# Patient Record
Sex: FEMALE | Race: WHITE | NOT HISPANIC OR LATINO | Employment: OTHER
[De-identification: names, ages, dates, MRNs, and addresses within clinical notes are randomized per-mention and may not be internally consistent; named-entity substitution may affect disease eponyms.]

---

## 2021-10-26 ENCOUNTER — TRANSCRIBE ORDERS (OUTPATIENT)
Dept: OTHER | Age: 81
End: 2021-10-26

## 2021-10-26 ENCOUNTER — PRE VISIT (OUTPATIENT)
Dept: OTHER | Age: 81
End: 2021-10-26

## 2021-10-26 DIAGNOSIS — D25.9 FIBROID UTERUS: ICD-10-CM

## 2021-10-26 DIAGNOSIS — N83.8 OVARIAN MASS: Primary | ICD-10-CM

## 2021-10-28 NOTE — TELEPHONE ENCOUNTER
RECORDS STATUS - ALL OTHER DIAGNOSIS      Action    Action Taken 11/3/21  LVM for pt re: location of PET    2021 10:13 AM ABT  Imaging disc from [facility] received and given to Nathan for upload.       RECORDS RECEIVED FROM: [facility]   DATE RECEIVED: 10/27/21   NOTES STATUS DETAILS   OFFICE NOTE from referring provider CE - [facility] 10/21/21   OFFICE NOTE from medical oncologist     DISCHARGE SUMMARY from hospital     DISCHARGE REPORT from the ER CE - [facility] 9/3/21   OPERATIVE REPORT     MEDICATION LIST [facility]   CLINICAL TRIAL TREATMENTS TO DATE     LABS     PATHOLOGY REPORTS NA    ANYTHING RELATED TO DIAGNOSIS Epic/CE 10/6/21   GENONOMIC TESTING     TYPE:     IMAGING (NEED IMAGES & REPORT)     NM Bone Scan Whole Body (Pet?) Requested 11/3 10/15/21: [facility]    [employer] [facility]   CT SCANS PACS 10/4/21: [facility]   MRI PACS 10/17/21: [facility]   MAMMO     ULTRASOUND PACS 10/7/21: [facility]   PET

## 2021-11-03 ENCOUNTER — PRE VISIT (OUTPATIENT)
Dept: ONCOLOGY | Facility: CLINIC | Age: 81
End: 2021-11-03

## 2021-11-03 ENCOUNTER — ONCOLOGY VISIT (OUTPATIENT)
Dept: ONCOLOGY | Facility: CLINIC | Age: 81
End: 2021-11-03
Attending: OBSTETRICS & GYNECOLOGY
Payer: COMMERCIAL

## 2021-11-03 VITALS
SYSTOLIC BLOOD PRESSURE: 144 MMHG | RESPIRATION RATE: 16 BRPM | OXYGEN SATURATION: 97 % | DIASTOLIC BLOOD PRESSURE: 91 MMHG | HEART RATE: 81 BPM | WEIGHT: 139.5 LBS | BODY MASS INDEX: 24.72 KG/M2 | HEIGHT: 63 IN | TEMPERATURE: 97.8 F

## 2021-11-03 DIAGNOSIS — R97.8 OTHER ABNORMAL TUMOR MARKERS: ICD-10-CM

## 2021-11-03 DIAGNOSIS — D25.9 FIBROID UTERUS: ICD-10-CM

## 2021-11-03 DIAGNOSIS — N83.8 OVARIAN MASS: ICD-10-CM

## 2021-11-03 DIAGNOSIS — C50.919 BREAST CA (H): Primary | ICD-10-CM

## 2021-11-03 PROCEDURE — G0463 HOSPITAL OUTPT CLINIC VISIT: HCPCS

## 2021-11-03 PROCEDURE — 36415 COLL VENOUS BLD VENIPUNCTURE: CPT | Performed by: OBSTETRICS & GYNECOLOGY

## 2021-11-03 PROCEDURE — 86300 IMMUNOASSAY TUMOR CA 15-3: CPT | Performed by: OBSTETRICS & GYNECOLOGY

## 2021-11-03 PROCEDURE — 82378 CARCINOEMBRYONIC ANTIGEN: CPT | Performed by: OBSTETRICS & GYNECOLOGY

## 2021-11-03 PROCEDURE — 99205 OFFICE O/P NEW HI 60 MIN: CPT | Performed by: OBSTETRICS & GYNECOLOGY

## 2021-11-03 RX ORDER — LORAZEPAM 0.5 MG/1
.25-.5 TABLET ORAL
COMMUNITY
Start: 2021-10-06

## 2021-11-03 RX ORDER — MELOXICAM 7.5 MG/1
TABLET ORAL
Status: ON HOLD | COMMUNITY
Start: 2021-09-03 | End: 2021-11-16

## 2021-11-03 RX ORDER — ACETAMINOPHEN 500 MG
500 TABLET ORAL DAILY PRN
COMMUNITY

## 2021-11-03 RX ORDER — HEPARIN SODIUM 5000 [USP'U]/.5ML
5000 INJECTION, SOLUTION INTRAVENOUS; SUBCUTANEOUS
Status: CANCELLED | OUTPATIENT
Start: 2021-11-03

## 2021-11-03 RX ORDER — CEFAZOLIN SODIUM 2 G/50ML
2 SOLUTION INTRAVENOUS
Status: CANCELLED | OUTPATIENT
Start: 2021-11-03

## 2021-11-03 RX ORDER — PHENAZOPYRIDINE HYDROCHLORIDE 200 MG/1
200 TABLET, FILM COATED ORAL ONCE
Status: CANCELLED | OUTPATIENT
Start: 2021-11-03 | End: 2021-11-03

## 2021-11-03 RX ORDER — CEFAZOLIN SODIUM 2 G/50ML
2 SOLUTION INTRAVENOUS SEE ADMIN INSTRUCTIONS
Status: CANCELLED | OUTPATIENT
Start: 2021-11-03

## 2021-11-03 ASSESSMENT — MIFFLIN-ST. JEOR: SCORE: 1076.9

## 2021-11-03 ASSESSMENT — PAIN SCALES - GENERAL: PAINLEVEL: NO PAIN (0)

## 2021-11-03 NOTE — NURSING NOTE
"Oncology Rooming Note    November 3, 2021 8:47 AM   Amaya York is a 80 year old female who presents for:    Chief Complaint   Patient presents with     Oncology Clinic Visit     Ovarian mass      Initial Vitals: BP (!) 144/91   Pulse 81   Temp 97.8  F (36.6  C)   Resp 16   Ht 1.6 m (5' 3\")   Wt 63.3 kg (139 lb 8 oz)   SpO2 97%   BMI 24.71 kg/m   Estimated body mass index is 24.71 kg/m  as calculated from the following:    Height as of this encounter: 1.6 m (5' 3\").    Weight as of this encounter: 63.3 kg (139 lb 8 oz). Body surface area is 1.68 meters squared.  No Pain (0) Comment: Data Unavailable   No LMP recorded. Patient is postmenopausal.  Allergies reviewed: Yes  Medications reviewed: Yes    Medications: Medication refills not needed today.  Pharmacy name entered into EPIC: [facility] - [address]    Clinical concerns: Has a few questions George was notified.      Corbin Marie MA            "

## 2021-11-03 NOTE — PROGRESS NOTES
Consult Notes on Referred Patient    Date: 11/3/2021       [facility]  [address] [address] [place] [address]       RE: Amaya Yrok  : 1940  MARIANA: 11/3/2021    Dear Dr. Leonard Banegas:    I had the pleasure of seeing your patient Amaya York here at the Gynecologic Cancer Clinic at the [facility] on 11/3/2021.  As you know she is a very pleasant 80 year old woman with a recent diagnosis of right pelvic mass.  Given these findings she was subsequently sent to the Gynecologic Cancer Clinic for new patient consultation.   , 2 prior vaginal deliveries.  Went through menopause at age 52.  Never been on hormone replacement therapy.  She has had a stage I breast cancer in the past, was on 5 years of aromatase inhibitor and had a lumpectomy with sentinel nodes.  Has been disease-free for many years now.  She is eating and drinking well.  No nausea, vomiting, fever or chills.  Normal urinary and bowel function.  No vaginal bleeding or discharge.  Noticed some lower back pain when she was active outside.  Of note, she is very active.  She walks 2 miles a day and bike rides regularly.  A workup revealed a complex right pelvic mass based on MRI-ultrasound, possibly a fibroid in the broad ligament versus from the right adnexa.  Her CA-125 was slightly elevated in the 40s.  No other evidence of any distant disease.          Past Medical History:  History of breast cancer.          Past Surgical History:  Open tubal ligation, open appendectomy and lumpectomy with sentinel lymph node sampling.        Health Maintenance:  Health Maintenance Due   Topic Date Due     DEXA  Never done     ADVANCE CARE PLANNING  Never done     HEPATITIS C SCREENING  Never done     LIPID  Never done     ZOSTER IMMUNIZATION (1 of 2) Never done     MEDICARE ANNUAL WELLNESS VISIT  Never done     FALL RISK ASSESSMENT  Never done     Pneumococcal Vaccine:  "65+ Years (1 of 1 - PPSV23) 12/14/2006     PHQ-2  Never done     INFLUENZA VACCINE (1) 09/01/2021       Never had a colonoscopy.            Current Medications:     has a current medication list which includes the following prescription(s): acetaminophen.       Allergies:     Codeine        Social History:     Social History     Tobacco Use     Smoking status: Never Smoker     Smokeless tobacco: Never Used   Substance Use Topics     Alcohol use: Not Currently       History   Drug Use Unknown           Family History:             Physical Exam:     BP (!) 144/91   Pulse 81   Temp 97.8  F (36.6  C)   Resp 16   Ht 1.6 m (5' 3\")   Wt 63.3 kg (139 lb 8 oz)   SpO2 97%   BMI 24.71 kg/m    Body mass index is 24.71 kg/m .    General Appearance: healthy and alert, no distress     Musculoskeletal: extremities non tender and without edema    Skin: no lesions or rashes     Neurological: normal gait, no gross defects     Psychiatric: appropriate mood and affect                               Hematological: normal cervical, supraclavicular and inguinal lymph nodes     ABDOMEN:  Soft, nontender, nondistended.  No organomegaly.  Well-healed midline incision as well as a right lower quadrant incision.  PELVIC EXAMINATION:  Normal external genitalia, normal vaginal mucosa, normal-appearing cervix.  Bimanual exam reveals a mobile mass on the right side.  It is nontender.  Rectovaginal confirms.          Assessment:    Amaya York is a 80 year old woman with a new diagnosis of complex pelvic mass.     A total of 60 minutes was spent with the patient, 40 minutes of which were spent in counseling the patient and/or treatment planning.    ASSESSMENT AND PLAN:      1. Complex right pelvic mass.     2. Elevated  in the 40s.     3. History of breast cancer.       4. Precision Medicine Program.      I discussed with the patient.  Recommended to proceed with a laparoscopic bilateral salpingo-oophorectomy, possible open, " possible cancer staging, possible hysterectomy.  Of note, she has gotten a PET scan.  We do not have the results.  If she has had a PET scan we do not need any additional imaging.  Otherwise, CT of the chest.  We will also obtain a CA as well as CA27-29 today.  We will have her see my colleagues in Anesthesia for preoperative optimization.  The patient agrees with this plan.  We will also consider for the Precision Medicine Program.  She agrees with plans.            Thank you for allowing us to participate in the care of your patient.         Sincerely,    Bobby Vazquez MD, MS    Department of Obstetrics and Gynecology   Division of Gynecologic Oncology   [facility]  Phone: [phone]    CC  Patient Care Team:  Orion Gerard PA-C as PCP - General  ORION GERARD

## 2021-11-03 NOTE — NURSING NOTE
Patient labs drawn in clinic via venipuncture. Patient tolerated well. See flowsheet for details.      Erin Can, CHERRY on 11/3/2021 at 10:24 AM

## 2021-11-03 NOTE — LETTER
11/3/2021         RE: Amaya York  [address] [facility] [address]        Dear Colleague,    Thank you for referring your patient, Amaya York, to the [facility] CANCER [facility]. Please see a copy of my visit note below.                            Consult Notes on Referred Patient    Date: 11/3/2021       Dr. Leonard Banegas, [facility]  [address] [address] [place] [address]       RE: Amaya York  : 1940  MARIANA: 11/3/2021    Dear Dr. Leonard Banegas:    I had the pleasure of seeing your patient Amaya York here at the Gynecologic Cancer Clinic at the [facility] on 11/3/2021.  As you know she is a very pleasant 80 year old woman with a recent diagnosis of right pelvic mass.  Given these findings she was subsequently sent to the Gynecologic Cancer Clinic for new patient consultation.       Past Medical History:    No past medical history on file.      Past Surgical History:    No past surgical history on file.      Health Maintenance:  Health Maintenance Due   Topic Date Due     DEXA  Never done     ADVANCE CARE PLANNING  Never done     HEPATITIS C SCREENING  Never done     LIPID  Never done     ZOSTER IMMUNIZATION (1 of 2) Never done     MEDICARE ANNUAL WELLNESS VISIT  Never done     FALL RISK ASSESSMENT  Never done     Pneumococcal Vaccine: 65+ Years (1 of 1 - PPSV23) 2006     PHQ-2  Never done     INFLUENZA VACCINE (1) 2021       Last Pap Smear: ***              Result: {NORMAL/ABNORMAL:925350}  She {HAS:618963} had a history of abnormal Pap smears.    Last Mammogram: ***              Result: {NORMAL/ABNORMAL:284271}      She {HAS:848792} had a history of abnormal mammograms.    Last Colonoscopy: ***              Result: {NORMAL/ABNORMAL:299010}                    Last DEXA Scan: ***              Result: {NORMAL/ABNORMAL:696221}    Last Diabetes Screening; ***    Last Thyroid Screening:      ***    Last Cholest Screening:       "***      Current Medications:     has a current medication list which includes the following prescription(s): acetaminophen.       Allergies:     Codeine        Social History:     Social History     Tobacco Use     Smoking status: Never Smoker     Smokeless tobacco: Never Used   Substance Use Topics     Alcohol use: Not Currently       History   Drug Use Unknown           Family History:     The patient's family history is notable for ***.    No family history on file.      Physical Exam:     BP (!) 144/91   Pulse 81   Temp 97.8  F (36.6  C)   Resp 16   Ht 1.6 m (5' 3\")   Wt 63.3 kg (139 lb 8 oz)   SpO2 97%   BMI 24.71 kg/m    Body mass index is 24.71 kg/m .    General Appearance: healthy and alert, no distress     HEENT:  no thyromegaly, no palpable nodules or masses        Cardiovascular: regular rate and rhythm, no gallops, rubs or murmurs     Respiratory: lungs clear, no rales, rhonchi or wheezes, normal diaphragmatic excursion    Musculoskeletal: extremities non tender and without edema    Skin: no lesions or rashes     Neurological: normal gait, no gross defects     Psychiatric: appropriate mood and affect                               Hematological: normal cervical, supraclavicular and inguinal lymph nodes     Gastrointestinal:       abdomen soft, non-tender, non-distended, no organomegaly or masses    Genitourinary: External genitalia and urethral meatus appears normal.  Vagina is smooth without nodularity or masses.  Cervix appears normal and without lesions.  Bimanual exam reveal no masses, nodularity or fullness.  Recto-vaginal exam confirms these findings.      Assessment:    Amaya York is a 80 year old woman with a new diagnosis of ***.     A total of *** minutes was spent with the patient, *** minutes of which were spent in counseling the patient and/or treatment planning.      Plan:     1.)   ***     2.) Genetic risk factors were assessed and the patient does not meet the qualifications " for a referral.      3.) Labs and/or tests ordered include:  ***.     4.) Health maintenance issues addressed today include ***.    5.) Pre-op teaching was completed today.  Risks of surgery were discussed to include: bleeding, transfusion, infection, unintentional injury to surrounding organs/structures.      Thank you for allowing us to participate in the care of your patient.         Sincerely,        Patient Care Team:  Orion Gerard PA-C as PCP - General  ORION GERARD        Again, thank you for allowing me to participate in the care of your patient.        Sincerely,        Bobby Vazquez MD

## 2021-11-03 NOTE — LETTER
11/3/2021      RE: Amaya York  [address] [facility] [address]                               Consult Notes on Referred Patient    Date: 11/3/2021       [facility]  [address] [address] [place] [address]       RE: Amaya York  : 1940  MARIANA: 11/3/2021    Dear Dr. Leonard Banegas:    I had the pleasure of seeing your patient Amaya York here at the Gynecologic Cancer Clinic at the [facility] on 11/3/2021.  As you know she is a very pleasant 80 year old woman with a recent diagnosis of right pelvic mass.  Given these findings she was subsequently sent to the Gynecologic Cancer Clinic for new patient consultation.   , 2 prior vaginal deliveries.  Went through menopause at age 52.  Never been on hormone replacement therapy.  She has had a stage I breast cancer in the past, was on 5 years of aromatase inhibitor and had a lumpectomy with sentinel nodes.  Has been disease-free for many years now.  She is eating and drinking well.  No nausea, vomiting, fever or chills.  Normal urinary and bowel function.  No vaginal bleeding or discharge.  Noticed some lower back pain when she was active outside.  Of note, she is very active.  She walks 2 miles a day and bike rides regularly.  A workup revealed a complex right pelvic mass based on MRI-ultrasound, possibly a fibroid in the broad ligament versus from the right adnexa.  Her CA-125 was slightly elevated in the 40s.  No other evidence of any distant disease.          Past Medical History:  History of breast cancer.          Past Surgical History:  Open tubal ligation, open appendectomy and lumpectomy with sentinel lymph node sampling.        Health Maintenance:  Health Maintenance Due   Topic Date Due     DEXA  Never done     ADVANCE CARE PLANNING  Never done     HEPATITIS C SCREENING  Never done     LIPID  Never done     ZOSTER IMMUNIZATION (1 of 2) Never done     MEDICARE ANNUAL WELLNESS VISIT   "Never done     FALL RISK ASSESSMENT  Never done     Pneumococcal Vaccine: 65+ Years (1 of 1 - PPSV23) 12/14/2006     PHQ-2  Never done     INFLUENZA VACCINE (1) 09/01/2021       Never had a colonoscopy.            Current Medications:     has a current medication list which includes the following prescription(s): acetaminophen.       Allergies:     Codeine        Social History:     Social History     Tobacco Use     Smoking status: Never Smoker     Smokeless tobacco: Never Used   Substance Use Topics     Alcohol use: Not Currently       History   Drug Use Unknown           Family History:             Physical Exam:     BP (!) 144/91   Pulse 81   Temp 97.8  F (36.6  C)   Resp 16   Ht 1.6 m (5' 3\")   Wt 63.3 kg (139 lb 8 oz)   SpO2 97%   BMI 24.71 kg/m    Body mass index is 24.71 kg/m .    General Appearance: healthy and alert, no distress     Musculoskeletal: extremities non tender and without edema    Skin: no lesions or rashes     Neurological: normal gait, no gross defects     Psychiatric: appropriate mood and affect                               Hematological: normal cervical, supraclavicular and inguinal lymph nodes     ABDOMEN:  Soft, nontender, nondistended.  No organomegaly.  Well-healed midline incision as well as a right lower quadrant incision.  PELVIC EXAMINATION:  Normal external genitalia, normal vaginal mucosa, normal-appearing cervix.  Bimanual exam reveals a mobile mass on the right side.  It is nontender.  Rectovaginal confirms.          Assessment:    Amaya York is a 80 year old woman with a new diagnosis of complex pelvic mass.     A total of 60 minutes was spent with the patient, 40 minutes of which were spent in counseling the patient and/or treatment planning.    ASSESSMENT AND PLAN:      1. Complex right pelvic mass.     2. Elevated  in the 40s.     3. History of breast cancer.       4. Precision Medicine Program.      I discussed with the patient.  Recommended to proceed " with a laparoscopic bilateral salpingo-oophorectomy, possible open, possible cancer staging, possible hysterectomy.  Of note, she has gotten a PET scan.  We do not have the results.  If she has had a PET scan we do not need any additional imaging.  Otherwise, CT of the chest.  We will also obtain a CA as well as CA27-29 today.  We will have her see my colleagues in Anesthesia for preoperative optimization.  The patient agrees with this plan.  We will also consider for the Precision Medicine Program.  She agrees with plans.            Thank you for allowing us to participate in the care of your patient.         Sincerely,    Bobby Vazquez MD, MS    Department of Obstetrics and Gynecology   Division of Gynecologic Oncology   [facility]  Phone: [phone]    CC  Patient Care Team:  Leonard Banegas PA-C as PCP - General

## 2021-11-04 LAB
CANCER AG27-29 SERPL-ACNC: 42 U/ML (ref 0–39)
CEA SERPL-MCNC: <0.5 UG/L (ref 0–2.5)

## 2021-11-05 ENCOUNTER — PATIENT OUTREACH (OUTPATIENT)
Dept: ONCOLOGY | Facility: CLINIC | Age: 81
End: 2021-11-05

## 2021-11-05 ENCOUNTER — TELEPHONE (OUTPATIENT)
Dept: ONCOLOGY | Facility: CLINIC | Age: 81
End: 2021-11-05

## 2021-11-05 DIAGNOSIS — Z11.59 ENCOUNTER FOR SCREENING FOR OTHER VIRAL DISEASES: ICD-10-CM

## 2021-11-05 DIAGNOSIS — R19.00 PELVIC MASS: Primary | ICD-10-CM

## 2021-11-05 DIAGNOSIS — C50.919 BREAST CA (H): ICD-10-CM

## 2021-11-05 NOTE — TELEPHONE ENCOUNTER
RNCC: Sumi Frias; [phone]    Surgery is scheduled with Dr. Vazquez on 11/16 at [facility].  Scheduled per availability.    H&P to be completed by Primary Care Provider; [facility].    COVID-19 test: 11[place]15 [place] [facility] 1st floor    Post-op: 12/8, telephone visit     The RN completed the education regarding the surgery.     Patient will receive a phone call from pre-admission nurses 1-2 days prior to surgery with arrival and start time.    The surgery packet was provided by the RN during appointment and sent via US mail    Patient will complete COVID-19 test that was scheduled by surgical coordinator 2-4 days prior to surgery.     I spoke with the patient and was able to confirm the scheduled information. No further action needed at this time.

## 2021-11-05 NOTE — TELEPHONE ENCOUNTER
RN reached out to patient to review surgery needs.    It was confirmed that patient did NOT have a PET scan and will need a Chest CT scan locally prior to surgery.     PAC could not be completed virtual [place]. NO PAC appt available day prior to surgery. Patient unable to come any other time.     Patient was able to get in with PCP for pre operative exam. This was discussed with Dr Vazquez and was approved.     Patient will see PCP 11/8 for pre operative exam    COVID test in our system as planned     CT scan order placed and  faxed to local team     Patient aware of the above     Gracie Frias RN

## 2021-11-05 NOTE — NURSING NOTE
UPDATE 11/5  Patient will complete pre operative exam with local PCP on 11/8. Note will be available in Care Everywhere once completed and signed. This was approved by MD    CT scan will completed locally as well.     Gracie Frias RN

## 2021-11-05 NOTE — TELEPHONE ENCOUNTER
----- Message from Iesha Conklin sent at 2021 12:56 PM CDT -----  Regardin/16 George  Can you call her about H&P, PET and CT?    She will need to do H&P near home because we can't do [place] PAC for [place] patients and no clinic openings 11/15 for PAC. :(     Surgery scheduled on  with Dr. Vazquez at [facility]    Appointments:   COVID: 11/15 at [facility], lab 1st floor    POST-OP:  telephone    I was able to confirm the above dates.     - Iesha     ----- Message -----  From: Gracie Frias RN  Sent: 11/3/2021   3:03 PM CDT  To: Iesha Saldana   PAC   Possible CT CHest     POst op virtual     THANKS  Bhavani

## 2021-11-11 ENCOUNTER — PATIENT OUTREACH (OUTPATIENT)
Dept: ONCOLOGY | Facility: CLINIC | Age: 81
End: 2021-11-11
Payer: COMMERCIAL

## 2021-11-11 NOTE — TELEPHONE ENCOUNTER
Patient left a voicemail stating PA needed to be completed ASAP for imaging she wanted completed in [place]     Patient did not request a call back just stated that I needed to be aware.     Outside PA team was contacted and they stated they were working on this.     Today RN received a note that stated patient was concerned as she had tried to reach the team and did not hear back and still has not completed the CT scan and will be home only another day.    RN reached out to patient and apologized that she miss understood the voicemail.     RN explained the difficulties of completing a PA for another location and the delay this causes.     Patient offered a CT Scan on Monday here at the [facility]    Patient agreeable to this.     Scheduling request sent    Gracie Frias RN

## 2021-11-15 ENCOUNTER — ANESTHESIA EVENT (OUTPATIENT)
Dept: SURGERY | Facility: CLINIC | Age: 81
End: 2021-11-15
Payer: COMMERCIAL

## 2021-11-15 ENCOUNTER — ANCILLARY PROCEDURE (OUTPATIENT)
Dept: CT IMAGING | Facility: CLINIC | Age: 81
End: 2021-11-15
Attending: OBSTETRICS & GYNECOLOGY
Payer: COMMERCIAL

## 2021-11-15 ENCOUNTER — LAB (OUTPATIENT)
Dept: LAB | Facility: CLINIC | Age: 81
End: 2021-11-15

## 2021-11-15 DIAGNOSIS — Z11.59 ENCOUNTER FOR SCREENING FOR OTHER VIRAL DISEASES: ICD-10-CM

## 2021-11-15 DIAGNOSIS — R19.00 PELVIC MASS: ICD-10-CM

## 2021-11-15 DIAGNOSIS — C50.919 BREAST CA (H): ICD-10-CM

## 2021-11-15 LAB
CREAT BLD-MCNC: 1 MG/DL (ref 0.5–1)
GFR SERPL CREATININE-BSD FRML MDRD: 54 ML/MIN/1.73M2
SARS-COV-2 RNA RESP QL NAA+PROBE: NEGATIVE

## 2021-11-15 PROCEDURE — 71260 CT THORAX DX C+: CPT | Performed by: RADIOLOGY

## 2021-11-15 PROCEDURE — U0003 INFECTIOUS AGENT DETECTION BY NUCLEIC ACID (DNA OR RNA); SEVERE ACUTE RESPIRATORY SYNDROME CORONAVIRUS 2 (SARS-COV-2) (CORONAVIRUS DISEASE [COVID-19]), AMPLIFIED PROBE TECHNIQUE, MAKING USE OF HIGH THROUGHPUT TECHNOLOGIES AS DESCRIBED BY CMS-2020-01-R: HCPCS | Mod: 90 | Performed by: PATHOLOGY

## 2021-11-15 PROCEDURE — U0005 INFEC AGEN DETEC AMPLI PROBE: HCPCS | Mod: 90 | Performed by: PATHOLOGY

## 2021-11-15 RX ORDER — IOPAMIDOL 755 MG/ML
68 INJECTION, SOLUTION INTRAVASCULAR ONCE
Status: COMPLETED | OUTPATIENT
Start: 2021-11-15 | End: 2021-11-15

## 2021-11-15 RX ADMIN — IOPAMIDOL 68 ML: 755 INJECTION, SOLUTION INTRAVASCULAR at 16:13

## 2021-11-16 ENCOUNTER — HOSPITAL ENCOUNTER (OUTPATIENT)
Facility: CLINIC | Age: 81
Discharge: HOME | End: 2021-11-16
Attending: OBSTETRICS & GYNECOLOGY | Admitting: OBSTETRICS & GYNECOLOGY
Payer: COMMERCIAL

## 2021-11-16 ENCOUNTER — ANESTHESIA (OUTPATIENT)
Dept: SURGERY | Facility: CLINIC | Age: 81
End: 2021-11-16
Payer: COMMERCIAL

## 2021-11-16 VITALS
SYSTOLIC BLOOD PRESSURE: 132 MMHG | OXYGEN SATURATION: 96 % | WEIGHT: 139.55 LBS | RESPIRATION RATE: 18 BRPM | HEART RATE: 89 BPM | HEIGHT: 63 IN | TEMPERATURE: 97.7 F | BODY MASS INDEX: 24.73 KG/M2 | DIASTOLIC BLOOD PRESSURE: 72 MMHG

## 2021-11-16 DIAGNOSIS — Z90.722 S/P BSO (BILATERAL SALPINGO-OOPHORECTOMY): Primary | ICD-10-CM

## 2021-11-16 DIAGNOSIS — N83.8 OVARIAN MASS: ICD-10-CM

## 2021-11-16 DIAGNOSIS — Z98.890 STATUS POST LAPAROSCOPY: Primary | ICD-10-CM

## 2021-11-16 LAB
ABO/RH(D): NORMAL
ANTIBODY SCREEN: NEGATIVE
GLUCOSE BLDC GLUCOMTR-MCNC: 91 MG/DL (ref 70–99)
SPECIMEN EXPIRATION DATE: NORMAL

## 2021-11-16 PROCEDURE — 250N000011 HC RX IP 250 OP 636: Performed by: NURSE ANESTHETIST, CERTIFIED REGISTERED

## 2021-11-16 PROCEDURE — 86900 BLOOD TYPING SEROLOGIC ABO: CPT | Performed by: NURSE ANESTHETIST, CERTIFIED REGISTERED

## 2021-11-16 PROCEDURE — 250N000011 HC RX IP 250 OP 636: Performed by: OBSTETRICS & GYNECOLOGY

## 2021-11-16 PROCEDURE — 250N000013 HC RX MED GY IP 250 OP 250 PS 637: Performed by: OBSTETRICS & GYNECOLOGY

## 2021-11-16 PROCEDURE — 250N000025 HC SEVOFLURANE, PER MIN: Performed by: OBSTETRICS & GYNECOLOGY

## 2021-11-16 PROCEDURE — 250N000009 HC RX 250: Performed by: NURSE ANESTHETIST, CERTIFIED REGISTERED

## 2021-11-16 PROCEDURE — 710N000012 HC RECOVERY PHASE 2, PER MINUTE: Performed by: OBSTETRICS & GYNECOLOGY

## 2021-11-16 PROCEDURE — 360N000076 HC SURGERY LEVEL 3, PER MIN: Performed by: OBSTETRICS & GYNECOLOGY

## 2021-11-16 PROCEDURE — 88307 TISSUE EXAM BY PATHOLOGIST: CPT | Mod: 26 | Performed by: PATHOLOGY

## 2021-11-16 PROCEDURE — 88342 IMHCHEM/IMCYTCHM 1ST ANTB: CPT | Mod: 26 | Performed by: PATHOLOGY

## 2021-11-16 PROCEDURE — 36415 COLL VENOUS BLD VENIPUNCTURE: CPT | Performed by: NURSE ANESTHETIST, CERTIFIED REGISTERED

## 2021-11-16 PROCEDURE — 250N000011 HC RX IP 250 OP 636: Performed by: ANESTHESIOLOGY

## 2021-11-16 PROCEDURE — 88313 SPECIAL STAINS GROUP 2: CPT | Mod: 26 | Performed by: PATHOLOGY

## 2021-11-16 PROCEDURE — 258N000003 HC RX IP 258 OP 636: Performed by: NURSE ANESTHETIST, CERTIFIED REGISTERED

## 2021-11-16 PROCEDURE — 710N000010 HC RECOVERY PHASE 1, LEVEL 2, PER MIN: Performed by: OBSTETRICS & GYNECOLOGY

## 2021-11-16 PROCEDURE — 88112 CYTOPATH CELL ENHANCE TECH: CPT | Mod: 26 | Performed by: PATHOLOGY

## 2021-11-16 PROCEDURE — 88305 TISSUE EXAM BY PATHOLOGIST: CPT | Mod: 26 | Performed by: PATHOLOGY

## 2021-11-16 PROCEDURE — 88331 PATH CONSLTJ SURG 1 BLK 1SPC: CPT | Mod: TC | Performed by: OBSTETRICS & GYNECOLOGY

## 2021-11-16 PROCEDURE — 999N000141 HC STATISTIC PRE-PROCEDURE NURSING ASSESSMENT: Performed by: OBSTETRICS & GYNECOLOGY

## 2021-11-16 PROCEDURE — 272N000001 HC OR GENERAL SUPPLY STERILE: Performed by: OBSTETRICS & GYNECOLOGY

## 2021-11-16 PROCEDURE — 88112 CYTOPATH CELL ENHANCE TECH: CPT | Mod: TC | Performed by: OBSTETRICS & GYNECOLOGY

## 2021-11-16 PROCEDURE — 88331 PATH CONSLTJ SURG 1 BLK 1SPC: CPT | Mod: 26 | Performed by: PATHOLOGY

## 2021-11-16 PROCEDURE — 370N000017 HC ANESTHESIA TECHNICAL FEE, PER MIN: Performed by: OBSTETRICS & GYNECOLOGY

## 2021-11-16 RX ORDER — LIDOCAINE 40 MG/G
CREAM TOPICAL
Status: DISCONTINUED | OUTPATIENT
Start: 2021-11-16 | End: 2021-11-16 | Stop reason: HOSPADM

## 2021-11-16 RX ORDER — CEFAZOLIN SODIUM 2 G/100ML
2 INJECTION, SOLUTION INTRAVENOUS SEE ADMIN INSTRUCTIONS
Status: DISCONTINUED | OUTPATIENT
Start: 2021-11-16 | End: 2021-11-16 | Stop reason: HOSPADM

## 2021-11-16 RX ORDER — ACETAMINOPHEN 325 MG/1
975 TABLET ORAL EVERY 6 HOURS PRN
Qty: 50 TABLET | Refills: 0 | Status: SHIPPED | OUTPATIENT
Start: 2021-11-16

## 2021-11-16 RX ORDER — HEPARIN SODIUM 5000 [USP'U]/.5ML
5000 INJECTION, SOLUTION INTRAVENOUS; SUBCUTANEOUS
Status: COMPLETED | OUTPATIENT
Start: 2021-11-16 | End: 2021-11-16

## 2021-11-16 RX ORDER — LIDOCAINE HYDROCHLORIDE 20 MG/ML
INJECTION, SOLUTION INFILTRATION; PERINEURAL PRN
Status: DISCONTINUED | OUTPATIENT
Start: 2021-11-16 | End: 2021-11-16

## 2021-11-16 RX ORDER — ONDANSETRON 2 MG/ML
4 INJECTION INTRAMUSCULAR; INTRAVENOUS EVERY 30 MIN PRN
Status: DISCONTINUED | OUTPATIENT
Start: 2021-11-16 | End: 2021-11-16 | Stop reason: HOSPADM

## 2021-11-16 RX ORDER — ONDANSETRON 4 MG/1
4 TABLET, ORALLY DISINTEGRATING ORAL EVERY 30 MIN PRN
Status: DISCONTINUED | OUTPATIENT
Start: 2021-11-16 | End: 2021-11-16 | Stop reason: HOSPADM

## 2021-11-16 RX ORDER — PHENAZOPYRIDINE HYDROCHLORIDE 200 MG/1
200 TABLET, FILM COATED ORAL ONCE
Status: COMPLETED | OUTPATIENT
Start: 2021-11-16 | End: 2021-11-16

## 2021-11-16 RX ORDER — PROPOFOL 10 MG/ML
INJECTION, EMULSION INTRAVENOUS PRN
Status: DISCONTINUED | OUTPATIENT
Start: 2021-11-16 | End: 2021-11-16

## 2021-11-16 RX ORDER — ONDANSETRON 2 MG/ML
INJECTION INTRAMUSCULAR; INTRAVENOUS PRN
Status: DISCONTINUED | OUTPATIENT
Start: 2021-11-16 | End: 2021-11-16

## 2021-11-16 RX ORDER — OXYCODONE HYDROCHLORIDE 5 MG/1
5 TABLET ORAL EVERY 4 HOURS PRN
Status: DISCONTINUED | OUTPATIENT
Start: 2021-11-16 | End: 2021-11-16 | Stop reason: HOSPADM

## 2021-11-16 RX ORDER — EPHEDRINE SULFATE 50 MG/ML
INJECTION, SOLUTION INTRAMUSCULAR; INTRAVENOUS; SUBCUTANEOUS PRN
Status: DISCONTINUED | OUTPATIENT
Start: 2021-11-16 | End: 2021-11-16

## 2021-11-16 RX ORDER — SODIUM CHLORIDE, SODIUM LACTATE, POTASSIUM CHLORIDE, CALCIUM CHLORIDE 600; 310; 30; 20 MG/100ML; MG/100ML; MG/100ML; MG/100ML
INJECTION, SOLUTION INTRAVENOUS CONTINUOUS PRN
Status: DISCONTINUED | OUTPATIENT
Start: 2021-11-16 | End: 2021-11-16

## 2021-11-16 RX ORDER — DEXAMETHASONE SODIUM PHOSPHATE 4 MG/ML
INJECTION, SOLUTION INTRA-ARTICULAR; INTRALESIONAL; INTRAMUSCULAR; INTRAVENOUS; SOFT TISSUE PRN
Status: DISCONTINUED | OUTPATIENT
Start: 2021-11-16 | End: 2021-11-16

## 2021-11-16 RX ORDER — FENTANYL CITRATE 50 UG/ML
INJECTION, SOLUTION INTRAMUSCULAR; INTRAVENOUS PRN
Status: DISCONTINUED | OUTPATIENT
Start: 2021-11-16 | End: 2021-11-16

## 2021-11-16 RX ORDER — OXYCODONE HYDROCHLORIDE 5 MG/1
5 TABLET ORAL
Status: DISCONTINUED | OUTPATIENT
Start: 2021-11-16 | End: 2021-11-16 | Stop reason: HOSPADM

## 2021-11-16 RX ORDER — OXYCODONE HYDROCHLORIDE 5 MG/1
5 TABLET ORAL EVERY 6 HOURS PRN
Qty: 6 TABLET | Refills: 0 | Status: SHIPPED | OUTPATIENT
Start: 2021-11-16 | End: 2021-11-16

## 2021-11-16 RX ORDER — PROCHLORPERAZINE MALEATE 5 MG
5 TABLET ORAL EVERY 6 HOURS PRN
Status: DISCONTINUED | OUTPATIENT
Start: 2021-11-16 | End: 2021-11-16 | Stop reason: HOSPADM

## 2021-11-16 RX ORDER — ACETAMINOPHEN 325 MG/1
975 TABLET ORAL ONCE
Status: DISCONTINUED | OUTPATIENT
Start: 2021-11-16 | End: 2021-11-16 | Stop reason: HOSPADM

## 2021-11-16 RX ORDER — AMOXICILLIN 250 MG
1-2 CAPSULE ORAL 2 TIMES DAILY
Qty: 30 TABLET | Refills: 0 | Status: SHIPPED | OUTPATIENT
Start: 2021-11-16

## 2021-11-16 RX ORDER — HYDROMORPHONE HCL IN WATER/PF 6 MG/30 ML
0.2 PATIENT CONTROLLED ANALGESIA SYRINGE INTRAVENOUS EVERY 5 MIN PRN
Status: DISCONTINUED | OUTPATIENT
Start: 2021-11-16 | End: 2021-11-16 | Stop reason: HOSPADM

## 2021-11-16 RX ORDER — CEFAZOLIN SODIUM 2 G/100ML
2 INJECTION, SOLUTION INTRAVENOUS
Status: COMPLETED | OUTPATIENT
Start: 2021-11-16 | End: 2021-11-16

## 2021-11-16 RX ORDER — FENTANYL CITRATE 50 UG/ML
25 INJECTION, SOLUTION INTRAMUSCULAR; INTRAVENOUS EVERY 5 MIN PRN
Status: DISCONTINUED | OUTPATIENT
Start: 2021-11-16 | End: 2021-11-16 | Stop reason: HOSPADM

## 2021-11-16 RX ORDER — SODIUM CHLORIDE, SODIUM LACTATE, POTASSIUM CHLORIDE, CALCIUM CHLORIDE 600; 310; 30; 20 MG/100ML; MG/100ML; MG/100ML; MG/100ML
INJECTION, SOLUTION INTRAVENOUS CONTINUOUS
Status: DISCONTINUED | OUTPATIENT
Start: 2021-11-16 | End: 2021-11-16 | Stop reason: HOSPADM

## 2021-11-16 RX ORDER — PROCHLORPERAZINE 25 MG
12.5 SUPPOSITORY, RECTAL RECTAL EVERY 12 HOURS PRN
Status: DISCONTINUED | OUTPATIENT
Start: 2021-11-16 | End: 2021-11-16 | Stop reason: HOSPADM

## 2021-11-16 RX ORDER — LABETALOL HYDROCHLORIDE 5 MG/ML
10 INJECTION, SOLUTION INTRAVENOUS
Status: DISCONTINUED | OUTPATIENT
Start: 2021-11-16 | End: 2021-11-16 | Stop reason: HOSPADM

## 2021-11-16 RX ADMIN — PROPOFOL 100 MG: 10 INJECTION, EMULSION INTRAVENOUS at 10:59

## 2021-11-16 RX ADMIN — Medication 2 G: at 11:05

## 2021-11-16 RX ADMIN — ONDANSETRON 4 MG: 2 INJECTION INTRAMUSCULAR; INTRAVENOUS at 12:52

## 2021-11-16 RX ADMIN — SODIUM CHLORIDE, SODIUM LACTATE, POTASSIUM CHLORIDE, CALCIUM CHLORIDE: 600; 310; 30; 20 INJECTION, SOLUTION INTRAVENOUS at 11:30

## 2021-11-16 RX ADMIN — SODIUM CHLORIDE, POTASSIUM CHLORIDE, SODIUM LACTATE AND CALCIUM CHLORIDE: 600; 310; 30; 20 INJECTION, SOLUTION INTRAVENOUS at 10:44

## 2021-11-16 RX ADMIN — PROCHLORPERAZINE EDISYLATE 5 MG: 5 INJECTION INTRAMUSCULAR; INTRAVENOUS at 16:43

## 2021-11-16 RX ADMIN — FENTANYL CITRATE 50 MCG: 50 INJECTION, SOLUTION INTRAMUSCULAR; INTRAVENOUS at 10:56

## 2021-11-16 RX ADMIN — ROCURONIUM BROMIDE 50 MG: 50 INJECTION, SOLUTION INTRAVENOUS at 10:59

## 2021-11-16 RX ADMIN — SUGAMMADEX 200 MG: 100 INJECTION, SOLUTION INTRAVENOUS at 13:05

## 2021-11-16 RX ADMIN — FENTANYL CITRATE 50 MCG: 50 INJECTION, SOLUTION INTRAMUSCULAR; INTRAVENOUS at 10:59

## 2021-11-16 RX ADMIN — HYDROMORPHONE HYDROCHLORIDE 0.2 MG: 0.2 INJECTION, SOLUTION INTRAMUSCULAR; INTRAVENOUS; SUBCUTANEOUS at 14:08

## 2021-11-16 RX ADMIN — PHENYLEPHRINE HYDROCHLORIDE 50 MCG: 10 INJECTION INTRAVENOUS at 11:03

## 2021-11-16 RX ADMIN — DEXAMETHASONE SODIUM PHOSPHATE 4 MG: 4 INJECTION, SOLUTION INTRA-ARTICULAR; INTRALESIONAL; INTRAMUSCULAR; INTRAVENOUS; SOFT TISSUE at 10:59

## 2021-11-16 RX ADMIN — PHENYLEPHRINE HYDROCHLORIDE 100 MCG: 10 INJECTION INTRAVENOUS at 11:06

## 2021-11-16 RX ADMIN — LIDOCAINE HYDROCHLORIDE 40 MG: 20 INJECTION, SOLUTION INFILTRATION; PERINEURAL at 10:59

## 2021-11-16 RX ADMIN — PROPOFOL 20 MCG/KG/MIN: 10 INJECTION, EMULSION INTRAVENOUS at 11:20

## 2021-11-16 RX ADMIN — FENTANYL CITRATE 50 MCG: 50 INJECTION, SOLUTION INTRAMUSCULAR; INTRAVENOUS at 11:32

## 2021-11-16 RX ADMIN — Medication 10 MG: at 11:03

## 2021-11-16 RX ADMIN — PHENAZOPYRIDINE HYDROCHLORIDE 200 MG: 200 TABLET, FILM COATED ORAL at 10:22

## 2021-11-16 RX ADMIN — MINERAL OIL AND PETROLATUM 1 INCH: 150; 830 OINTMENT OPHTHALMIC at 11:00

## 2021-11-16 RX ADMIN — MIDAZOLAM 1 MG: 1 INJECTION INTRAMUSCULAR; INTRAVENOUS at 10:44

## 2021-11-16 RX ADMIN — HYDROMORPHONE HYDROCHLORIDE 0.2 MG: 0.2 INJECTION, SOLUTION INTRAMUSCULAR; INTRAVENOUS; SUBCUTANEOUS at 14:58

## 2021-11-16 RX ADMIN — PHENYLEPHRINE HYDROCHLORIDE 50 MCG: 10 INJECTION INTRAVENOUS at 11:23

## 2021-11-16 RX ADMIN — HEPARIN SODIUM 5000 UNITS: 10000 INJECTION, SOLUTION INTRAVENOUS; SUBCUTANEOUS at 10:22

## 2021-11-16 RX ADMIN — ONDANSETRON 4 MG: 2 INJECTION INTRAMUSCULAR; INTRAVENOUS at 14:07

## 2021-11-16 RX ADMIN — ROCURONIUM BROMIDE 10 MG: 50 INJECTION, SOLUTION INTRAVENOUS at 12:06

## 2021-11-16 ASSESSMENT — ACTIVITIES OF DAILY LIVING (ADL)
ADLS_ACUITY_SCORE: 4
ADLS_ACUITY_SCORE: 2
ADLS_ACUITY_SCORE: 4
ADLS_ACUITY_SCORE: 4

## 2021-11-16 ASSESSMENT — MIFFLIN-ST. JEOR: SCORE: 1077.13

## 2021-11-16 NOTE — ANESTHESIA CARE TRANSFER NOTE
Patient: Amaya York    Procedure: Procedure(s):  Laparoscopic Bilateral Salpingo-Oophorectomy and lysis of adhesions greater than 30 minutes       Diagnosis: Ovarian mass [N83.8]  Diagnosis Additional Information: No value filed.    Anesthesia Type:   General     Note:    Oropharynx: oropharynx clear of all foreign objects and spontaneously breathing  Level of Consciousness: drowsy  Oxygen Supplementation: face mask  Level of Supplemental Oxygen (L/min / FiO2): 6  Independent Airway: airway patency satisfactory and stable  Dentition: dentition unchanged  Vital Signs Stable: post-procedure vital signs reviewed and stable  Report to RN Given: handoff report given  Patient transferred to: PACU    Handoff Report: Identifed the Patient, Identified the Reponsible Provider, Reviewed the pertinent medical history, Discussed the surgical course, Reviewed Intra-OP anesthesia mangement and issues during anesthesia, Set expectations for post-procedure period and Allowed opportunity for questions and acknowledgement of understanding      Vitals:  Vitals Value Taken Time   BP     Temp     Pulse     Resp     SpO2 100 % 11/16/21 1320   Vitals shown include unvalidated device data.    Electronically Signed By: SHANI Stnoe CRNA  November 16, 2021  1:21 PM

## 2021-11-16 NOTE — ANESTHESIA PROCEDURE NOTES
Airway       Patient location during procedure: OR       Procedure Start/Stop Times: 11/16/2021 11:02 AM  Staff -        Anesthesiologist:  Altagracia Avitia MD       CRNA: Farida Kasper APRN CRNA       Performed By: CRNA  Consent for Airway        Urgency: elective  Indications and Patient Condition       Indications for airway management: mee-procedural       Induction type:intravenous       Mask difficulty assessment: 2 - vent by mask + OA or adjuvant +/- NMBA    Final Airway Details       Final airway type: endotracheal airway       Successful airway: ETT - single  Endotracheal Airway Details        ETT size (mm): 7.0       Cuffed: yes       Successful intubation technique: direct laryngoscopy       DL Blade Type: [place] 2       Grade View of Cords: 1       Adjucts: stylet       Position: Right       Measured from: lips       Secured at (cm): 20       Bite block used: None    Post intubation assessment        Placement verified by: capnometry, equal breath sounds and chest rise        Number of attempts at approach: 1       Number of other approaches attempted: 0       Secured with: pink tape       Ease of procedure: easy       Dentition: Intact and Unchanged

## 2021-11-16 NOTE — PROGRESS NOTES
"Gynecologic Oncology Postoperative Check Note  11/16/2021    S: Patient reports doing okay, somewhat dizzy and nauseated after sitting up but has tolerated crackers and juice, ambulated to bathroom. Voiding spontaneously, pain well controlled. No additional concerns at this time.    O:  Patient Vitals for the past 24 hrs:   BP Temp Temp src Pulse Resp SpO2 Height Weight   11/16/21 1625 125/83 -- -- 109 -- 95 % -- --   11/16/21 1623 125/83 97.7  F (36.5  C) Oral 109 16 93 % -- --   11/16/21 1600 139/79 -- -- 110 16 93 % -- --   11/16/21 1545 138/85 -- -- 107 12 98 % -- --   11/16/21 1530 126/82 -- -- 111 15 97 % -- --   11/16/21 1520 122/80 98.6  F (37  C) Temporal 102 14 97 % -- --   11/16/21 1515 118/76 -- -- 103 15 96 % -- --   11/16/21 1500 128/75 -- -- 104 14 96 % -- --   11/16/21 1445 124/77 -- -- 99 15 96 % -- --   11/16/21 1430 123/74 -- -- 98 14 95 % -- --   11/16/21 1415 125/77 98.1  F (36.7  C) Temporal 97 15 94 % -- --   11/16/21 1400 125/75 -- -- 95 15 96 % -- --   11/16/21 1345 132/79 -- -- 93 16 97 % -- --   11/16/21 1330 131/85 -- -- 91 15 100 % -- --   11/16/21 1319 135/78 (!) 96.6  F (35.9  C) Temporal 96 12 100 % -- --   11/16/21 0943 (!) 158/120 98  F (36.7  C) Oral 104 15 98 % 1.6 m (5' 3\") 63.3 kg (139 lb 8.8 oz)     Gen: alert and oriented, resting up in the chair  Cardio: rrr, nl s1 and s2, no m/r/g  Resp: lungs CTAB, no wheezes or crackles  Abdomen: soft, appropriately tender to palpation  Incision: 3 cm supraumbilical and 5 mm right and left lower quadrant incisions covered in glue and appear clean, dry, intact   Extremities: BLEs non-tender    A: 80 year old POD#0 s/p laparoscopic BSO for a pelvic mass that was benign of frozen pthology. Doing well in immediate postoperative period. She has voided, tolerated some PO without vomiting, ambulated, and pain is well controlled. Dizzy and mildly nauseated but symptoms improving, will continue to observe for now and can discharge shortly if " symptoms improved.  Given instructions to call or come to ED for pain not controlled my PO pain meds, n/v especially if unable to tolerate PO intake all day or not passing gas or had a BM, inability to urinate, fever/chills, heavy vaginal bleeding, or lightheadedness.  - Rx for senna/docusate and Tylenol, patient declines oxycodone due to codeine allergy.     Dispo: Home in 30-60 minutes if symptoms improved, has f/u w/Bobby Vazquez MD on 12/8/2021     Roya Avelar MD  Ob/Gyn Resident, PGY-4  11/16/21 5:34 PM

## 2021-11-16 NOTE — DISCHARGE INSTRUCTIONS
Same-Day Surgery   Adult Discharge Orders & Instructions     For 24 hours after surgery:  1. Get plenty of rest.  A responsible adult must stay with you for at least 24 hours after you leave the hospital.   2. Pain medication can slow your reflexes. Do not drive or use heavy equipment.  If you have weakness or tingling, don't drive or use heavy equipment until this feeling goes away.  3. Mixing alcohol and pain medication can cause dizziness and slow your breathing. It can even be fatal. Do not drink alcohol while taking pain medication.  4. Avoid strenuous or risky activities.  Ask for help when climbing stairs.   5. You may feel lightheaded.  If so, sit for a few minutes before standing.  Have someone help you get up.   6. If you have nausea (feel sick to your stomach), drink only clear liquids such as apple juice, ginger ale, broth or 7-Up.  Rest may also help.  Be sure to drink enough fluids.  Move to a regular diet as you feel able. Take pain medications with a small amount of solid food, such as toast or crackers, to avoid nausea.   7. A slight fever is normal. Call the doctor if your fever is over 100 F (37.7 C) (taken under the tongue) or lasts longer than 24 hours.  8. You may have a dry mouth, muscle aches, trouble sleeping or a sore throat.  These symptoms should go away after 24 hours.  9. Do not make important or legal decisions.   Pain Management:      1. Take pain medication (if prescribed) for pain as directed by your physician.        2. WARNING: If the pain medication you have been prescribed contains Tylenol  (acetaminophen), DO NOT take additional doses of Tylenol (acetaminophen).     Call your doctor for any of the followin.  Signs of infection (fever, growing tenderness at the surgery site, severe pain, a large amount of drainage or bleeding, foul-smelling drainage, redness, swelling).    2.  It has been over 8 to 10 hours since surgery and you are still not able to urinate (pee).    3.   Headache for over 24 hours.    4.  Numbness, tingling or weakness the day after surgery (if you had spinal anesthesia).        Emergency Department:  [facility]: [phone]  [facility]: [phone]               Rev. 10/2014

## 2021-11-16 NOTE — BRIEF OP NOTE
[facility]    Brief Operative Note    Pre-operative diagnosis: Ovarian mass [N83.8]  Post-operative diagnosis Pelvic mass adjacent to right IP ligament     Procedure: Procedure(s):  Laparoscopic bilateral salpingo-oophorectomy, removal of right pelvis mass, lysis of adhesions  Surgeon: Surgeon(s) and Role:     * Bobby Vazquez MD - Primary     * Karina Hancock MD - Fellow - Assisting  Anesthesia: General   Estimated Blood Loss: 10 ml  IVF: 1300 ml crystalloid  Urine: 250 ml   Drains: None  Specimens:   ID Type Source Tests Collected by Time Destination   1 : Pelvic washings Washings Pelvis NON-GYNECOLOGIC CYTOLOGY Karina Hancock MD 11/16/2021 11:48 AM    2 : Right ovary, fallopian tube, and pelvic mass Tissue Ovary and Fallopian Tube, Bilateral SURGICAL PATHOLOGY EXAM, RESEARCH SPECIMEN FOR BIONET TESTING Karina Hancock MD 11/16/2021 12:09 PM    3 :  Tissue Ovary and Fallopian Tube, Left SURGICAL PATHOLOGY EXAM Jose Cruz Oconnell RN 11/16/2021 12:30 PM      Findings:  On exam under anesthesia, normal appearing external genitalia, cervix without lesions. Vagina without nodularity. Rectum without nodularity/masses. On laparoscopy, normal appearing liver surface, diaphragm, stomach edge. Omentum, large and small bowel unremarkable. Peritoneum without implants. On abdominal entry, no evidence of injury. In the pelvis, enlarged fibroid uterus, bilateral ovaries and fallopian tubes appeared normal without masses, there was a 10 cm firm white mass adjacent to the right IP ligament consistent with fibroma in appearance but completely separate from the ovary. The right ovary was densely adherent to the right uterosacral ligament and rectosigmoid epiploica, lysed with cold-cut dissection. No clinically enlarged lymph nodes. Frozen section: negative for malignancy.  Before and following procedure, bilateral ureters were easily visualized to  vermiculate.  Complications: None.  Implants: * No implants in log *

## 2021-11-16 NOTE — ANESTHESIA PREPROCEDURE EVALUATION
Anesthesia Pre-Procedure Evaluation    Patient: Amaya oYrk   MRN: [id number] : 1940        Preoperative Diagnosis: Ovarian mass [N83.8]    Procedure : Procedure(s):  Laparoscopic Bilateral Salpingo-Oophorectomy  possible open, possible hysterectomy, possible cancer staging, possible debulking          Past Medical History:   Diagnosis Date     PONV (postoperative nausea and vomiting)     Severe      Past Surgical History:   Procedure Laterality Date     APPENDECTOMY       BREAST SURGERY      Lumpectomy      Allergies   Allergen Reactions     Codeine Nausea     Sulfa Drugs GI Disturbance      Social History     Tobacco Use     Smoking status: Never Smoker     Smokeless tobacco: Never Used   Substance Use Topics     Alcohol use: Not Currently      Wt Readings from Last 1 Encounters:   21 63.3 kg (139 lb 8.8 oz)        Anesthesia Evaluation   Pt has had prior anesthetic. Type: General.    History of anesthetic complications  - PONV.      ROS/MED HX  ENT/Pulmonary: Comment: Chronic eye dryness - uses NaCl ointment on eyes q hs while sleeping; requests we use intraop - neg pulmonary ROS     Neurologic:  - neg neurologic ROS     Cardiovascular: Comment: Hypertensive 150/100 today - attributes to anxiety; never treated for hypertension - neg cardiovascular ROS  (-) murmur   METS/Exercise Tolerance: >4 METS Comment: Rides bike 2-3 milies; walks 2 miles at 1 mile/20 min regularly; gardens   Hematologic:  - neg hematologic  ROS     Musculoskeletal:  - neg musculoskeletal ROS     GI/Hepatic:  - neg GI/hepatic ROS  (-) GERD   Renal/Genitourinary:  - neg Renal ROS     Endo:  - neg endo ROS     Psychiatric/Substance Use:  - neg psychiatric ROS     Infectious Disease:  - neg infectious disease ROS     Malignancy: Comment: Ovarian mass for BSO, possible JABARI   Breast CA   (+) Malignancy, History of Breast.Breast CA status post Surgery and Radiation.        Other:     (-) Any chance pregnant and Other  Significant Disability       Physical Exam    Airway        Mallampati: II   TM distance: > 3 FB   Neck ROM: full     Respiratory Devices and Support         Dental       (+) caps      Cardiovascular          Rhythm and rate: regular and normal (-) no murmur    Pulmonary           breath sounds clear to auscultation           OUTSIDE LABS:  CBC: No results found for: WBC, HGB, HCT, PLT  BMP:   Lab Results   Component Value Date    CR 1.0 11/15/2021    GLC 91 11/16/2021     COAGS: No results found for: PTT, INR, FIBR  POC: No results found for: BGM, HCG, HCGS  HEPATIC: No results found for: ALBUMIN, PROTTOTAL, ALT, AST, GGT, ALKPHOS, BILITOTAL, BILIDIRECT, GRACIE  OTHER: No results found for: PH, LACT, A1C, VANE, PHOS, MAG, LIPASE, AMYLASE, TSH, T4, T3, CRP, SED    Anesthesia Plan    ASA Status:  2      Anesthesia Type: General.     - Airway: ETT   Induction: Propofol.   Maintenance: Balanced.   Techniques and Equipment:     - Lines/Monitors: 2nd IV     Consents    Anesthesia Plan(s) and associated risks, benefits, and realistic alternatives discussed. Questions answered and patient/representative(s) expressed understanding.     - Discussed with:  Patient      - Extended Intubation/Ventilatory Support Discussed: No.      - Patient is DNR/DNI Status: No    Use of blood products discussed: Yes.     - Discussed with: Patient.     - Consented: consented to blood products            Reason for refusal: other.     Postoperative Care    Pain management: IV analgesics.   PONV prophylaxis: Ondansetron (or other 5HT-3), Dexamethasone or Solumedrol     Comments:    Patient seen and examined by me and available records reviewed. Details as above.   Anesthetic options and risks discussed with patient who agrees to proceed.      Altagracia Avitia MD  11/16/2021              Altagracia Avitia MD

## 2021-11-16 NOTE — OP NOTE
Operative Note     Pre-operative diagnosis: Ovarian mass    Post-operative diagnosis: Pelvic mass adjacent to right IP ligament     Procedure(s): Laparoscopic bilateral salpingo-oophorectomy, removal of right pelvis mass, lysis of adhesions    Surgeon(s) and Role:     * Bobby Vazquez MD - Primary     * Karina Hancock MD - Fellow - Assisting    Anesthesia:   GETA     IVF:  1300 mL    UOP: 250 mL    EBL: 10 mL    Drains: None; bragg catheter during the case.     Complications: None apparent    Specimen(s):  ID Type Source Tests Collected by Time Destination   1 : Pelvic washings Washings Pelvis NON-GYNECOLOGIC CYTOLOGY Karina Hancock MD 11/16/2021 11:48 AM     2 : Right ovary, fallopian tube, and pelvic mass Tissue Ovary and Fallopian Tube, Bilateral SURGICAL PATHOLOGY EXAM, RESEARCH SPECIMEN FOR BIONET TESTING Karina Hancock MD 11/16/2021 12:09 PM     3 :  Tissue Ovary and Fallopian Tube, Left SURGICAL PATHOLOGY EXAM Jose Cruz Oconnlel RN 11/16/2021 12:30 PM       Findings:   On exam under anesthesia, normal appearing external genitalia, cervix without lesions. Vagina without nodularity. Rectum without nodularity/masses. On laparoscopy, normal appearing liver surface, diaphragm, stomach edge. Omentum, large and small bowel unremarkable. Peritoneum without implants. On abdominal entry, no evidence of injury. In the pelvis, enlarged fibroid uterus, bilateral ovaries and fallopian tubes appeared normal without masses, there was a 10 cm firm white mass adjacent to the right IP ligament consistent with fibroma in appearance but completely separate from the ovary. No clinically enlarged lymph nodes. Frozen section: negative for malignancy.  Before and following procedure, bilateral ureters were easily visualized to vermiculate.    Indication:  Amaya York is a 80 year old who was referred to GYN Oncology for a complex right pelvic lisa. Surgical management was recommended and she was consented for the  above procedure.    Operation/Procedure:  Patient was taken to the operating room. General anesthesia was induced. She was prepped and draped in the usual sterile fashion. Surgical time out was performed. A bragg catheter was placed on the sterile field. After placement of a speculum, the cervix was grasped with a single toothed tenaculum and a uterine manipulator was placed into the uterus.     After changing gloves, attention was turned to the abdomen where a 12 mm vertical supra-umbilical incision was made and carried down to the fascia with blunt dissection. The fascia was grasped with two Kocher clamps and elevated before a small defect was make sharply with a scalpel. A was placed under each Kocher clamp with 0 Vicryl. S-retractors were used to bluntly enter the peritoneum. A 12 mm blunt Pasha port was advanced through this incision towards pelvis. The intraperitoneal balloon was filled and the port secured to the tags on either side. A 10 mm camera was advanced through the port to confirm intraperitoneal placement before attachment of gas and insufflation with CO2 to an operating pressure of 15mmHg.     The patient was placed in steep Trendelenburg position. A survey of the abdomen revealed the findings noted above. Attention was turned to placement of the accessory ports.  A 5 mm port was placed in both the right and left lower quadrants under direct visualization.  The LigaSure was used to take down filmy adhesions of the omentum to the uterine fundus and bowel epiploica to the right pelvic sidewall. Pelvis washings were then collected and sent to cytology.     The right ureter was visualized transperitoneally along the pelvic sidewall.  Using the monopolar scissors, the retroperitoneum was opened parallel to the right IP ligament and ureter was identified running within the retroperitoneal space after blunt dissection.  Next the IP ligament was sealed and transected using the LigaSure.  The mesosalpinx  was followed serially to the cornua with the Ligasure and both the utero-ovarian ligament were sealed and transected with the LigaSure. Bowel epiploica adhesions to the right ovary were taken down with cold cut using monopolar scissors. The right ovary was freed from the right pelvic sidewall with a combination of cold cut and monopolar engery using the scissors to completely free the specimen. The 10 mm camera was removed from th supraumbilical port and a 5 mm was placed through the RLQ port. A 15 mm endo catch bag was advanced through the Pasha port and the specimen placed inside. The Pasha port was removed and the bag brought to the port site. The fascia of this site was extended superiorly and inferiorly using Mireles scissors and an Benjamín-O retractor was placed within the endo catch bag. The specimen was grasped serially with ring forceps and morcellated using a modified Excite technique with a scalpel, with care taken to avoid spillage of the contents of the bag to the abdomen. Once the specimen was removed the Pasha port was replaced.     The left ovarian and fallopian tube was excised in a similar fashion to the right without any lysis of adhesions. The left adnexa was placed in an endo catch bag but removed intact without morcellation through the supraumbilical port. All pedicles were inspected and also found to be hemostatic. The pneumoperitoneum was allowed to escape through the ports which were then removed. The supraumbilical port fascia was closed with running 0-Vicryl and skin closed with running 3-0 Monocryl. Both RLQ and LLQ skin was closed with 3-0 Monocryl as well and all port sites were covered with skin glue. All counts were correct prior to the conclusion of the case. The bragg and uterine manipulator were removed. The patient was awakened, extubated, and taken to PACU in stable condition.    Bobby Vazquez MD, MS    Department of Obstetrics and Gynecology    Division of Gynecologic Oncology   [facility]  Phone: [phone]

## 2021-11-16 NOTE — ANESTHESIA POSTPROCEDURE EVALUATION
Patient: Amaya York    Procedure: Procedure(s):  Laparoscopic Bilateral Salpingo-Oophorectomy and lysis of adhesions greater than 30 minutes       Diagnosis:Ovarian mass [N83.8]  Diagnosis Additional Information: No value filed.    Anesthesia Type:  General    Note:  Disposition: Outpatient   Postop Pain Control: Uneventful            Sign Out: Well controlled pain   PONV: No   Neuro/Psych: Uneventful            Sign Out: Acceptable/Baseline neuro status   Airway/Respiratory: Uneventful            Sign Out: Acceptable/Baseline resp. status   CV/Hemodynamics: Uneventful            Sign Out: Acceptable CV status; No obvious hypovolemia; No obvious fluid overload   Other NRE: NONE   DID A NON-ROUTINE EVENT OCCUR? No    Event details/Postop Comments:  Awake, comfortable, conversant; satisfactory recovery from GA.    Altagracia Avitia MD  11/16/2021  3:55 PM           Last vitals:  Vitals Value Taken Time   /85 11/16/21 1550   Temp 37  C (98.6  F) 11/16/21 1520   Pulse 110 11/16/21 1553   Resp 14 11/16/21 1520   SpO2 96 % 11/16/21 1553   Vitals shown include unvalidated device data.    Electronically Signed By: Altagracia Avitia MD  November 16, 2021  3:54 PM

## 2021-11-18 LAB
PATH REPORT.COMMENTS IMP SPEC: NORMAL
PATH REPORT.FINAL DX SPEC: NORMAL
PATH REPORT.GROSS SPEC: NORMAL
PATH REPORT.RELEVANT HX SPEC: NORMAL

## 2021-11-22 LAB
PATH REPORT.COMMENTS IMP SPEC: NORMAL
PATH REPORT.FINAL DX SPEC: NORMAL
PATH REPORT.GROSS SPEC: NORMAL
PATH REPORT.INTRAOP OBS SPEC DOC: NORMAL
PATH REPORT.MICROSCOPIC SPEC OTHER STN: NORMAL
PATH REPORT.MICROSCOPIC SPEC OTHER STN: NORMAL
PATH REPORT.RELEVANT HX SPEC: NORMAL
PHOTO IMAGE: NORMAL

## 2021-12-08 ENCOUNTER — VIRTUAL VISIT (OUTPATIENT)
Dept: ONCOLOGY | Facility: CLINIC | Age: 81
End: 2021-12-08
Attending: OBSTETRICS & GYNECOLOGY
Payer: COMMERCIAL

## 2021-12-08 DIAGNOSIS — D27.9: Primary | ICD-10-CM

## 2021-12-08 PROCEDURE — 99024 POSTOP FOLLOW-UP VISIT: CPT | Mod: 95 | Performed by: OBSTETRICS & GYNECOLOGY

## 2021-12-08 PROCEDURE — 999N001193 HC VIDEO/TELEPHONE VISIT; NO CHARGE

## 2021-12-08 NOTE — LETTER
2021      RE: Amaya York  [address] [facility] [address]       Amaya is a 80 year old who is being evaluated via a billable telephone visit.      What phone number would you like to be contacted at? [phone]    How would you like to obtain your AVS? Kacy  Phone call duration: 20 minutes    Carina Arango                Follow Up Notes on Referred Patient    Date: 2021       Dr. Bobby Vazquez MD  [address] [place] [id number]       RE: Amaya York  : 1940  MARIANA: 2021    Dear Dr. Bobby Vazquez:    Amaya York is a 80 year old woman with a diagnosis of ovarian fibroma.   Patient has been doing well since surgery, she is eating and drinking well, no nausea, vomiting, fever or chills normal urinary and bowel function.    Past Medical History:    Past Medical History:   Diagnosis Date     PONV (postoperative nausea and vomiting)     Severe         Past Surgical History:    Past Surgical History:   Procedure Laterality Date     APPENDECTOMY       BREAST SURGERY      Lumpectomy     LAPAROSCOPIC SALPINGO-OOPHORECTOMY Bilateral 2021    Procedure: Laparoscopic Bilateral Salpingo-Oophorectomy and lysis of adhesions greater than 30 minutes;  Surgeon: Bobby Vazquez MD;  Location:  OR         Health Maintenance Due   Topic Date Due     DEXA  Never done     ADVANCE CARE PLANNING  Never done     HEPATITIS C SCREENING  Never done     LIPID  Never done     ZOSTER IMMUNIZATION (1 of 2) Never done     MEDICARE ANNUAL WELLNESS VISIT  Never done     FALL RISK ASSESSMENT  Never done     Pneumococcal Vaccine: 65+ Years (1 of 1 - PPSV23) 2011     PHQ-2  Never done     INFLUENZA VACCINE (1) 2021       Current Medications:     Current Outpatient Medications   Medication Sig Dispense Refill     acetaminophen (TYLENOL) 325 MG tablet Take 3 tablets (975 mg) by mouth every 6 hours as needed for mild pain 50 tablet 0     acetaminophen (TYLENOL) 500  MG tablet Take 500 mg by mouth daily as needed       LORazepam (ATIVAN) 0.5 MG tablet Take 0.25-0.5 mg by mouth       senna-docusate (SENOKOT-S/PERICOLACE) 8.6-50 MG tablet Take 1-2 tablets by mouth 2 times daily (Patient not taking: Reported on 12/8/2021) 30 tablet 0         Allergies:        Allergies   Allergen Reactions     Codeine Nausea     Sulfa Drugs GI Disturbance        Social History:     Social History     Tobacco Use     Smoking status: Never Smoker     Smokeless tobacco: Never Used   Substance Use Topics     Alcohol use: Not Currently       History   Drug Use Unknown             Physical Exam:     There were no vitals taken for this visit.  There is no height or weight on file to calculate BMI.    General Appearance: healthy and alert, no distress        Psychiatric: appropriate mood and affect                                     Assessment:    Amaya York is a 80 year old woman with a diagnosis of ovarian fibroma.     A total of 30 minutes was spent with the patient, 20 minutes of which were spent in counseling the patient and/or treatment planning.      1. Ovarian Fibroma    We did review the pathology, which is all benign. She does not require any additional adjuvant treatment. We discussed to still see a gynecologist once a year for health maintenance exams. The patient agrees with this plan all questions were answered.     Marianela Galvan MD, MS    Department of Obstetrics and Gynecology   Division of Gynecologic Oncology   [facility]  Phone: [phone]    CC  Patient Care Team:  Leonard Banegas PA-C as PCP - General  MARIANELA GALVAN

## 2021-12-08 NOTE — LETTER
2021         RE: Amaya York  [address] [facility] [address]        Dear Colleague,    Thank you for referring your patient, Amaya York, to the [facility]. Please see a copy of my visit note below.    Amaya is a 80 year old who is being evaluated via a billable telephone visit.      What phone number would you like to be contacted at? [phone]    How would you like to obtain your AVS? [email or web address]  Phone call duration: 20 minutes    Carina Arango                Follow Up Notes on Referred Patient    Date: 2021       Dr. Bobby Vazquez MD  [address] [place] [id number]       RE: Amaya York  : 1940  MARIANA: 2021    Dear Dr. Bobby Vazquez:    Amaya York is a 80 year old woman with a diagnosis of ovarian fibroma.   Patient has been doing well since surgery, she is eating and drinking well, no nausea, vomiting, fever or chills normal urinary and bowel function.    Past Medical History:    Past Medical History:   Diagnosis Date     PONV (postoperative nausea and vomiting)     Severe         Past Surgical History:    Past Surgical History:   Procedure Laterality Date     APPENDECTOMY       BREAST SURGERY      Lumpectomy     LAPAROSCOPIC SALPINGO-OOPHORECTOMY Bilateral 2021    Procedure: Laparoscopic Bilateral Salpingo-Oophorectomy and lysis of adhesions greater than 30 minutes;  Surgeon: Bobby Vazquez MD;  Location:  OR         Health Maintenance Due   Topic Date Due     DEXA  Never done     ADVANCE CARE PLANNING  Never done     HEPATITIS C SCREENING  Never done     LIPID  Never done     ZOSTER IMMUNIZATION (1 of 2) Never done     MEDICARE ANNUAL WELLNESS VISIT  Never done     FALL RISK ASSESSMENT  Never done     Pneumococcal Vaccine: 65+ Years (1 of 1 - PPSV23) 2011     PHQ-2  Never done     INFLUENZA VACCINE (1) 2021       Current Medications:     Current Outpatient Medications   Medication  Sig Dispense Refill     acetaminophen (TYLENOL) 325 MG tablet Take 3 tablets (975 mg) by mouth every 6 hours as needed for mild pain 50 tablet 0     acetaminophen (TYLENOL) 500 MG tablet Take 500 mg by mouth daily as needed       LORazepam (ATIVAN) 0.5 MG tablet Take 0.25-0.5 mg by mouth       senna-docusate (SENOKOT-S/PERICOLACE) 8.6-50 MG tablet Take 1-2 tablets by mouth 2 times daily (Patient not taking: Reported on 12/8/2021) 30 tablet 0         Allergies:        Allergies   Allergen Reactions     Codeine Nausea     Sulfa Drugs GI Disturbance        Social History:     Social History     Tobacco Use     Smoking status: Never Smoker     Smokeless tobacco: Never Used   Substance Use Topics     Alcohol use: Not Currently       History   Drug Use Unknown             Physical Exam:     There were no vitals taken for this visit.  There is no height or weight on file to calculate BMI.    General Appearance: healthy and alert, no distress        Psychiatric: appropriate mood and affect                                     Assessment:    Amaya Yrok is a 80 year old woman with a diagnosis of ovarian fibroma.     A total of 30 minutes was spent with the patient, 20 minutes of which were spent in counseling the patient and/or treatment planning.      1. Ovarian Fibroma    We did review the pathology, which is all benign. She does not require any additional adjuvant treatment. We discussed to still see a gynecologist once a year for health maintenance exams. The patient agrees with this plan all questions were answered.     Marianela Galvan MD, MS    Department of Obstetrics and Gynecology   Division of Gynecologic Oncology   [facility]  Phone: [phone]    CC  Patient Care Team:  Leonard Banegas PA-C as PCP - General  AMRIANELA GALVAN      Again, thank you for allowing me to participate in the care of your patient.        Sincerely,        Marianela Galvan MD

## 2021-12-08 NOTE — PROGRESS NOTES
Amaya is a 80 year old who is being evaluated via a billable telephone visit.      What phone number would you like to be contacted at? [phone]    How would you like to obtain your AVS? Kacy  Phone call duration: 20 minutes    Carina Arango                Follow Up Notes on Referred Patient    Date: 2021       Dr. Bobby Vazquez MD  [address] [place] [id number]       RE: Amaya York  : 1940  MARIANA: 2021    Dear Dr. Bobby Vazquez:    Amaya York is a 80 year old woman with a diagnosis of ovarian fibroma.   Patient has been doing well since surgery, she is eating and drinking well, no nausea, vomiting, fever or chills normal urinary and bowel function.    Past Medical History:    Past Medical History:   Diagnosis Date     PONV (postoperative nausea and vomiting)     Severe         Past Surgical History:    Past Surgical History:   Procedure Laterality Date     APPENDECTOMY       BREAST SURGERY      Lumpectomy     LAPAROSCOPIC SALPINGO-OOPHORECTOMY Bilateral 2021    Procedure: Laparoscopic Bilateral Salpingo-Oophorectomy and lysis of adhesions greater than 30 minutes;  Surgeon: Bobby Vazquez MD;  Location: [facility] OR         Health Maintenance Due   Topic Date Due     DEXA  Never done     ADVANCE CARE PLANNING  Never done     HEPATITIS C SCREENING  Never done     LIPID  Never done     ZOSTER IMMUNIZATION (1 of 2) Never done     MEDICARE ANNUAL WELLNESS VISIT  Never done     FALL RISK ASSESSMENT  Never done     Pneumococcal Vaccine: 65+ Years (1 of 1 - PPSV23) 2011     PHQ-2  Never done     INFLUENZA VACCINE (1) 2021       Current Medications:     Current Outpatient Medications   Medication Sig Dispense Refill     acetaminophen (TYLENOL) 325 MG tablet Take 3 tablets (975 mg) by mouth every 6 hours as needed for mild pain 50 tablet 0     acetaminophen (TYLENOL) 500 MG tablet Take 500 mg by mouth daily as needed       LORazepam (ATIVAN)  0.5 MG tablet Take 0.25-0.5 mg by mouth       senna-docusate (SENOKOT-S/PERICOLACE) 8.6-50 MG tablet Take 1-2 tablets by mouth 2 times daily (Patient not taking: Reported on 12/8/2021) 30 tablet 0         Allergies:        Allergies   Allergen Reactions     Codeine Nausea     Sulfa Drugs GI Disturbance        Social History:     Social History     Tobacco Use     Smoking status: Never Smoker     Smokeless tobacco: Never Used   Substance Use Topics     Alcohol use: Not Currently       History   Drug Use Unknown             Physical Exam:     There were no vitals taken for this visit.  There is no height or weight on file to calculate BMI.    General Appearance: healthy and alert, no distress        Psychiatric: appropriate mood and affect                                     Assessment:    Amaya York is a 80 year old woman with a diagnosis of ovarian fibroma.     A total of 30 minutes was spent with the patient, 20 minutes of which were spent in counseling the patient and/or treatment planning.      1. Ovarian Fibroma    We did review the pathology, which is all benign. She does not require any additional adjuvant treatment. We discussed to still see a gynecologist once a year for health maintenance exams. The patient agrees with this plan all questions were answered.     Marianela Galvan MD, MS    Department of Obstetrics and Gynecology   Division of Gynecologic Oncology   [facility]  Phone: [phone]    CC  Patient Care Team:  Leonard Banegas PA-C as PCP - General  MARIANELA GALVAN

## 2021-12-12 ENCOUNTER — HEALTH MAINTENANCE LETTER (OUTPATIENT)
Age: 81
End: 2021-12-12

## 2022-10-03 ENCOUNTER — HEALTH MAINTENANCE LETTER (OUTPATIENT)
Age: 82
End: 2022-10-03

## 2023-02-11 ENCOUNTER — HEALTH MAINTENANCE LETTER (OUTPATIENT)
Age: 83
End: 2023-02-11

## 2024-03-09 ENCOUNTER — HEALTH MAINTENANCE LETTER (OUTPATIENT)
Age: 84
End: 2024-03-09

## (undated) DEVICE — GLOVE PROTEXIS BLUE W/NEU-THERA 8.0  2D73EB80

## (undated) DEVICE — PROTECTOR ARM ONE-STEP TRENDELENBURG 40418

## (undated) DEVICE — LINEN TOWEL PACK X30 5481

## (undated) DEVICE — ENDO TROCAR SLEEVE KII ADV FIXATION 05X100MM CFS02

## (undated) DEVICE — ENDO SCOPE WARMER SEAL  C3101

## (undated) DEVICE — SOL WATER IRRIG 1000ML BOTTLE 2F7114

## (undated) DEVICE — ESU ENDO SCISSORS 5MM CVD 5DCS

## (undated) DEVICE — SU VICRYL 2-0 CT-2 27" J333H

## (undated) DEVICE — SU VICRYL 0 TIE 54" J608H

## (undated) DEVICE — ADH SKIN CLOSURE PREMIERPRO EXOFIN 1.0ML 3470

## (undated) DEVICE — DEVICE SUTURE PASSER 14GA WECK EFX EFXSP2

## (undated) DEVICE — PREP CHLORAPREP 26ML TINTED ORANGE  260815

## (undated) DEVICE — ENDO POUCH UNIV RETRIEVAL SYSTEM INZII 10MM CD001

## (undated) DEVICE — RETR ELEV / UTERINE MANIPULATOR V-CARE MED CUP 60-6085-201A

## (undated) DEVICE — GLOVE PROTEXIS MICRO 7.5  2D73PM75

## (undated) DEVICE — SUCTION MANIFOLD NEPTUNE 2 SYS 4 PORT 0702-020-000

## (undated) DEVICE — PREP SCRUB SOL EXIDINE 4% CHG 4OZ 29002-404

## (undated) DEVICE — ENDO TROCAR FIRST ENTRY KII FIOS ADV FIX 05X100MM CFF03

## (undated) DEVICE — SPECIMEN TRAP MUCOUS 40ML LUKI C30200A

## (undated) DEVICE — SOL NACL 0.9% INJ 1000ML BAG 2B1324X

## (undated) DEVICE — Device

## (undated) DEVICE — ESU GROUND PAD ADULT W/CORD E7507

## (undated) DEVICE — LINEN TOWEL PACK X6 WHITE 5487

## (undated) DEVICE — SU VICRYL 0 UR-6 27" J603H

## (undated) DEVICE — WIPES FOLEY CARE SURESTEP PROVON DFC100

## (undated) DEVICE — SU MONOCRYL 3-0 PS-1 27" Y936H

## (undated) DEVICE — KIT PATIENT POSITIONING PIGAZZI LATEX FREE 40580

## (undated) DEVICE — ENDO TROCAR BLUNT TIP KII BALLOON 12X100MM C0R47

## (undated) DEVICE — ESU PENCIL W/COATED BLADE E2450H

## (undated) DEVICE — JELLY LUBRICATING SURGILUBE 2OZ TUBE

## (undated) DEVICE — ENDO POUCH UNIVERSAL RETRIEVAL SYSTEM INZII 12/15MM CD004

## (undated) DEVICE — BLADE KNIFE SURG 10 371110

## (undated) DEVICE — SOL NACL 0.9% IRRIG 1000ML BOTTLE 2F7124

## (undated) DEVICE — DRSG TELFA 3X8" 1238

## (undated) RX ORDER — ROCURONIUM BROMIDE 50 MG/5 ML
SYRINGE (ML) INTRAVENOUS
Status: DISPENSED
Start: 2021-11-16

## (undated) RX ORDER — ONDANSETRON 2 MG/ML
INJECTION INTRAMUSCULAR; INTRAVENOUS
Status: DISPENSED
Start: 2021-11-16

## (undated) RX ORDER — LIDOCAINE HYDROCHLORIDE 20 MG/ML
INJECTION, SOLUTION EPIDURAL; INFILTRATION; INTRACAUDAL; PERINEURAL
Status: DISPENSED
Start: 2021-11-16

## (undated) RX ORDER — CEFAZOLIN SODIUM 2 G/100ML
INJECTION, SOLUTION INTRAVENOUS
Status: DISPENSED
Start: 2021-11-16

## (undated) RX ORDER — HYDROMORPHONE HCL IN WATER/PF 6 MG/30 ML
PATIENT CONTROLLED ANALGESIA SYRINGE INTRAVENOUS
Status: DISPENSED
Start: 2021-11-16

## (undated) RX ORDER — DEXAMETHASONE SODIUM PHOSPHATE 4 MG/ML
INJECTION, SOLUTION INTRA-ARTICULAR; INTRALESIONAL; INTRAMUSCULAR; INTRAVENOUS; SOFT TISSUE
Status: DISPENSED
Start: 2021-11-16

## (undated) RX ORDER — HYDROMORPHONE HYDROCHLORIDE 1 MG/ML
INJECTION, SOLUTION INTRAMUSCULAR; INTRAVENOUS; SUBCUTANEOUS
Status: DISPENSED
Start: 2021-11-16

## (undated) RX ORDER — PROPOFOL 10 MG/ML
INJECTION, EMULSION INTRAVENOUS
Status: DISPENSED
Start: 2021-11-16

## (undated) RX ORDER — HEPARIN SODIUM 5000 [USP'U]/.5ML
INJECTION, SOLUTION INTRAVENOUS; SUBCUTANEOUS
Status: DISPENSED
Start: 2021-11-16

## (undated) RX ORDER — PHENAZOPYRIDINE HYDROCHLORIDE 200 MG/1
TABLET, FILM COATED ORAL
Status: DISPENSED
Start: 2021-11-16

## (undated) RX ORDER — FENTANYL CITRATE 50 UG/ML
INJECTION, SOLUTION INTRAMUSCULAR; INTRAVENOUS
Status: DISPENSED
Start: 2021-11-16